# Patient Record
Sex: FEMALE | Race: WHITE | NOT HISPANIC OR LATINO | Employment: FULL TIME | URBAN - METROPOLITAN AREA
[De-identification: names, ages, dates, MRNs, and addresses within clinical notes are randomized per-mention and may not be internally consistent; named-entity substitution may affect disease eponyms.]

---

## 2021-06-07 ENCOUNTER — OFFICE VISIT (OUTPATIENT)
Dept: URGENT CARE | Facility: URGENT CARE | Age: 36
End: 2021-06-07
Payer: COMMERCIAL

## 2021-06-07 VITALS
WEIGHT: 250 LBS | TEMPERATURE: 97.6 F | SYSTOLIC BLOOD PRESSURE: 130 MMHG | OXYGEN SATURATION: 99 % | DIASTOLIC BLOOD PRESSURE: 79 MMHG | HEART RATE: 65 BPM

## 2021-06-07 DIAGNOSIS — R09.81 NASAL CONGESTION: ICD-10-CM

## 2021-06-07 DIAGNOSIS — H57.11 ACUTE RIGHT EYE PAIN: Primary | ICD-10-CM

## 2021-06-07 PROBLEM — F41.9 ANXIETY: Status: ACTIVE | Noted: 2021-06-07

## 2021-06-07 PROCEDURE — U0003 INFECTIOUS AGENT DETECTION BY NUCLEIC ACID (DNA OR RNA); SEVERE ACUTE RESPIRATORY SYNDROME CORONAVIRUS 2 (SARS-COV-2) (CORONAVIRUS DISEASE [COVID-19]), AMPLIFIED PROBE TECHNIQUE, MAKING USE OF HIGH THROUGHPUT TECHNOLOGIES AS DESCRIBED BY CMS-2020-01-R: HCPCS | Performed by: FAMILY MEDICINE

## 2021-06-07 PROCEDURE — U0005 INFEC AGEN DETEC AMPLI PROBE: HCPCS | Performed by: FAMILY MEDICINE

## 2021-06-07 PROCEDURE — 99203 OFFICE O/P NEW LOW 30 MIN: CPT | Performed by: FAMILY MEDICINE

## 2021-06-07 RX ORDER — SERTRALINE HYDROCHLORIDE 100 MG/1
150 TABLET, FILM COATED ORAL
COMMUNITY
Start: 2021-04-14

## 2021-06-07 RX ORDER — FLUTICASONE PROPIONATE 50 MCG
1-2 SPRAY, SUSPENSION (ML) NASAL DAILY
COMMUNITY

## 2021-06-07 RX ORDER — BUSPIRONE HYDROCHLORIDE 5 MG/1
5 TABLET ORAL
COMMUNITY
Start: 2021-04-14

## 2021-06-07 ASSESSMENT — ENCOUNTER SYMPTOMS
SHORTNESS OF BREATH: 0
NAUSEA: 0
VOMITING: 0
EYE DISCHARGE: 0
WHEEZING: 0
SORE THROAT: 0
PHOTOPHOBIA: 1
FEVER: 0
COUGH: 0
EYE ITCHING: 0

## 2021-06-07 NOTE — PROGRESS NOTES
Assessment & Plan     Nathaly was seen today for sinus problem.    Diagnoses and all orders for this visit:    Acute right eye pain, improving.  Differential was considered, including (but not limited to) acute sinusitis and preseptal cellulitis.  -     amoxicillin-clavulanate (AUGMENTIN) 875-125 MG tablet; Take 1 tablet by mouth 2 times daily for 7 days    Nasal congestion, with a known history of seasonal allergies.  Cannot rule out acute sinusitis, as noted above.  Patient is on Flonase.  COVID-19 testing is pending.  -     Symptomatic COVID-19 Virus (Coronavirus) by PCR    Discussed risks and benefits of treatment strategies.    Patient Instructions     Quarantine/infection precautions, as discussed.    Over-the-counter medications, only as discussed.    Warm packs 20 minutes three times daily, as discussed.     Augmentin, only as prescribed/discussed.    Continue Flonase, as before.    Follow-up if your condition worsens or does not improve over the next 72 hours.    Follow up in the ER immediately if you develop: severe/worsening eye pain, vision concerns, or other emergent symptoms.      Consider a follow up eye exam, as discussed.    Follow-up with primary care to establish care, as discussed.    Kasia Moss MD  [facility] NATANAEL Andersen is a 35 year old female, who presents to clinic today for the following health issues:    Chief Complaint   Patient presents with     Sinus Problem     X5 Days patient noticed swelling of right eye the day after receiving 2nd covid vaccine,experiencing alot of pressure in eye and pain, no red, itchy, watery symptoms, when applying pressure her symptoms feels better, taking ibuprofen - slight releif , also experiencing jaw and facial pain and right ear pain         HPI    The patient is a new patient, who received her 2nd COVID-19 vaccination (Moderna) 5 days ago, 6/2/2021.  The patient states that she awakened with right  eye/sinus pressure and puffiness/swelling the day after receiving her vaccine.  Patient reports chronic nasal congestion and postnasal drainage (on Claritin and Flonase), as well as allergies this time of the year.  Patient has a new puppy.  Right eye pain/swelling are 50% improved.  Right maxillary sinus pain is improving, but it is still 5-6/10 severity.  The patient states that she is prone to sinus infections, last treated with Augmentin and Prednisone 2/24/2021.  See the Review of Systems section below.  Treatments Tried:  Ibuprofen 600 mg q6 hours manages her pain.  Additional History:  Eyelash extensions were removed 1 week ago.  Last eye exam was ~ 1 year ago.  COVID-19/Other Exposures:  None reported.    Not pregnant, post hysterectomy.    Review of Systems   Constitutional: Negative for fever.   HENT: Positive for congestion and postnasal drip. Negative for sore throat.    Eyes: Positive for photophobia (With sunlight, but not indoors.). Negative for discharge, itching and visual disturbance.   Respiratory: Negative for cough, shortness of breath and wheezing.    Cardiovascular: Negative for chest pain.   Gastrointestinal: Negative for nausea and vomiting.         Objective    /79   Pulse 65   Temp 97.6  F (36.4  C) (Tympanic)   Wt 113.4 kg (250 lb)   SpO2 99%   Physical Exam   GENERAL APPEARANCE:  Awake, alert, and in no acute distress.  PSYCHIATRIC:  Pleasant, smiling affect.  HEENT:  Sclera anicteric.  No conjunctivitis.  There is mild edema noted involving the right upper eyelid, with tenderness to palpation noted involving the area directly beneath the right eye.  No erythema or ecchymosis.  No crepitus.  PERRLA.  Extraocular movements are intact.  No photophobia.  Bilateral TM's and canals are within normal limits.  Mild nasal congestion, with clear postnasal drainage.  Sinuses are not significantly tender to palpation.  No significant erythema in the posterior pharynx.  No edema or  exudates of the oral mucosa or posterior pharynx.  Mucous membranes moist.  NECK:  Spontaneous full range of motion.  No thyromegaly or mass.  No lymphadenopathy.  HEART:  Normal S1, S2.  Regular rate and rhythm.  No murmurs, rubs, or gallops.  LUNGS:  No respiratory distress.  No wheezes, rales, or rhonchi.  EXTREMITIES:  Moves 4 extremities.     NEUROLOGIC:  Gait within normal limits.  No facial droop or acute neurologic deficits.  SKIN:  No rash.

## 2021-06-07 NOTE — PATIENT INSTRUCTIONS
Quarantine/infection precautions, as discussed.    Over-the-counter medications, only as discussed.    Warm packs 20 minutes three times daily, as discussed.     Augmentin, only as prescribed/discussed.    Continue Flonase, as before.    Follow-up if your condition worsens or does not improve over the next 72 hours.    Follow up in the ER immediately if you develop: severe/worsening eye pain, vision concerns, or other emergent symptoms.

## 2021-06-08 LAB
LABORATORY COMMENT REPORT: NORMAL
SARS-COV-2 RNA RESP QL NAA+PROBE: NEGATIVE
SARS-COV-2 RNA RESP QL NAA+PROBE: NORMAL
SPECIMEN SOURCE: NORMAL
SPECIMEN SOURCE: NORMAL

## 2022-05-04 ENCOUNTER — OFFICE VISIT (OUTPATIENT)
Dept: URGENT CARE | Facility: URGENT CARE | Age: 37
End: 2022-05-04
Payer: COMMERCIAL

## 2022-05-04 VITALS
OXYGEN SATURATION: 98 % | SYSTOLIC BLOOD PRESSURE: 129 MMHG | DIASTOLIC BLOOD PRESSURE: 79 MMHG | TEMPERATURE: 98.6 F | HEART RATE: 76 BPM

## 2022-05-04 DIAGNOSIS — J22 LOWER RESPIRATORY TRACT INFECTION: ICD-10-CM

## 2022-05-04 DIAGNOSIS — J45.21 MILD INTERMITTENT ASTHMA WITH ACUTE EXACERBATION: ICD-10-CM

## 2022-05-04 DIAGNOSIS — J01.90 ACUTE SINUSITIS WITH COEXISTING CONDITION REQUIRING PROPHYLACTIC TREATMENT: Primary | ICD-10-CM

## 2022-05-04 PROCEDURE — 99214 OFFICE O/P EST MOD 30 MIN: CPT | Performed by: FAMILY MEDICINE

## 2022-05-04 RX ORDER — DOXYCYCLINE 100 MG/1
100 CAPSULE ORAL 2 TIMES DAILY
Qty: 20 CAPSULE | Refills: 0 | Status: SHIPPED | OUTPATIENT
Start: 2022-05-04 | End: 2022-05-14

## 2022-05-04 RX ORDER — METHYLPREDNISOLONE 4 MG
TABLET, DOSE PACK ORAL
Qty: 21 TABLET | Refills: 0 | Status: SHIPPED | OUTPATIENT
Start: 2022-05-04

## 2022-05-04 NOTE — PROGRESS NOTES
SUBJECTIVE:   Nathaly Ramirez is a 36 year old female presenting with a chief complaint of headache, congestion, sore throat, cough/chest congested.  More SOB, has inhaler.  Onset of symptoms was 1 week(s) ago.  Course of illness is worsening.    Severity moderate  Current and Associated symptoms: cough, chest congestion  Treatment measures tried include Fluids, Rest, OTC cough medication.  Predisposing factors include ill contact: Work, Hx asthma and allergies    Had migraine headaches and is followed by Neurology, was given Medrol dospak and did seem to improved    Has asthma, on sigulair and has inhaler    Completed Moderna COVID vaccinations.  Had COVID infection this January 2022    Home rapid COVID tests negative 4/27, [facility] 4/29 lab rapid and PCR and also negative    No past medical history on file.  Current Outpatient Medications   Medication Sig Dispense Refill     busPIRone (BUSPAR) 5 MG tablet Take 5 mg by mouth       fluticasone (FLONASE) 50 MCG/ACT nasal spray Spray 1-2 sprays into both nostrils daily       sertraline (ZOLOFT) 100 MG tablet Take 150 mg by mouth       Social History     Tobacco Use     Smoking status: Former Smoker     Smokeless tobacco: Never Used   Substance Use Topics     Alcohol use: Not on file       ROS:  Review of systems negative except as stated above.    OBJECTIVE:  /79 (BP Location: Right arm, Patient Position: Sitting, Cuff Size: Adult Regular)   Pulse 76   Temp 98.6  F (37  C)   SpO2 98%   GENERAL APPEARANCE: healthy, alert and no distress  EYES: EOMI,  PERRL, conjunctiva clear  RESP: lungs clear to auscultation - no rales, rhonchi or wheezes  PSYCH: mentation appears normal and affect normal/bright    ASSESSMENT/PLAN:   (J01.90) Acute sinusitis with coexisting condition requiring prophylactic treatment  (primary encounter diagnosis)  Plan: doxycycline hyclate (VIBRAMYCIN) 100 MG capsule            (J45.21) Mild intermittent asthma with acute exacerbation  Plan:  methylPREDNISolone (MEDROL DOSEPAK) 4 MG tablet        therapy pack        Continue with inhaler      (J22) Lower respiratory tract infection  Plan: doxycycline hyclate (VIBRAMYCIN) 100 MG capsule            Reassurance given, reviewed symptomatic treatment with sparingly with tylenol and ibuprofen as neurologist did not want her to take this due to rebound headaches.  Encourage plenty of fluids and rest.  RX Doxycycline given for sinus infection and lower respiratory tract infection due to worsening symptoms and underlying co-morbid medical diagnosis of asthma and allergies.  RX Medrol dospak given for asthma flare, encourage to use albuterol inhaler for cough, wheezing and SOB symptoms.    Follow up with primary provider if no improvement of symptoms in 1 week    Daniel Vines MD  May 4, 2022 6:57 PM

## 2023-02-11 ENCOUNTER — HEALTH MAINTENANCE LETTER (OUTPATIENT)
Age: 38
End: 2023-02-11

## 2024-03-09 ENCOUNTER — HEALTH MAINTENANCE LETTER (OUTPATIENT)
Age: 39
End: 2024-03-09

## 2024-12-11 ENCOUNTER — APPOINTMENT (OUTPATIENT)
Dept: URBAN - METROPOLITAN AREA CLINIC 253 | Age: 39
Setting detail: DERMATOLOGY
End: 2024-12-16

## 2024-12-11 VITALS — RESPIRATION RATE: 14 BRPM | WEIGHT: 200 LBS | HEIGHT: 72 IN

## 2024-12-11 DIAGNOSIS — L72.0 EPIDERMAL CYST: ICD-10-CM

## 2024-12-11 DIAGNOSIS — L909 UNSPECIFIED HYPERTROPHIC AND ATROPHIC CONDITIONS OF SKIN: ICD-10-CM

## 2024-12-11 DIAGNOSIS — L919 UNSPECIFIED HYPERTROPHIC AND ATROPHIC CONDITIONS OF SKIN: ICD-10-CM

## 2024-12-11 DIAGNOSIS — L663 OTHER SPECIFIED DISEASES OF HAIR AND HAIR FOLLICLES: ICD-10-CM

## 2024-12-11 DIAGNOSIS — L987 UNSPECIFIED HYPERTROPHIC AND ATROPHIC CONDITIONS OF SKIN: ICD-10-CM

## 2024-12-11 DIAGNOSIS — L738 OTHER SPECIFIED DISEASES OF HAIR AND HAIR FOLLICLES: ICD-10-CM

## 2024-12-11 PROBLEM — D23.72 OTHER BENIGN NEOPLASM OF SKIN OF LEFT LOWER LIMB, INCLUDING HIP: Status: ACTIVE | Noted: 2024-12-11

## 2024-12-11 PROBLEM — L02.221 FURUNCLE OF ABDOMINAL WALL: Status: ACTIVE | Noted: 2024-12-11

## 2024-12-11 PROCEDURE — OTHER COUNSELING: OTHER

## 2024-12-11 PROCEDURE — 99204 OFFICE O/P NEW MOD 45 MIN: CPT

## 2024-12-11 PROCEDURE — OTHER ADDITIONAL NOTES: OTHER

## 2024-12-11 PROCEDURE — OTHER MIPS QUALITY: OTHER

## 2024-12-11 PROCEDURE — OTHER PRESCRIPTION: OTHER

## 2024-12-11 RX ORDER — CHLORHEXIDINE GLUCONATE 213 G/1000ML
4% SOLUTION TOPICAL
Qty: 118 | Refills: 2 | Status: ERX | COMMUNITY
Start: 2024-12-11

## 2024-12-11 RX ORDER — CLINDAMYCIN PHOSPHATE 10 MG/ML
1% SOLUTION TOPICAL BID
Qty: 60 | Refills: 6 | Status: ERX | COMMUNITY
Start: 2024-12-11

## 2024-12-11 ASSESSMENT — LOCATION DETAILED DESCRIPTION DERM
LOCATION DETAILED: LEFT PROXIMAL POSTERIOR THIGH
LOCATION DETAILED: RIGHT GLUTEAL CREASE
LOCATION DETAILED: MONS PUBIS

## 2024-12-11 ASSESSMENT — LOCATION SIMPLE DESCRIPTION DERM
LOCATION SIMPLE: RIGHT GLUTEAL CREASE
LOCATION SIMPLE: GROIN
LOCATION SIMPLE: LEFT POSTERIOR THIGH

## 2024-12-11 ASSESSMENT — LOCATION ZONE DERM
LOCATION ZONE: LEG
LOCATION ZONE: VULVA

## 2024-12-11 NOTE — PROCEDURE: ADDITIONAL NOTES
Render Risk Assessment In Note?: no
Detail Level: Simple
Additional Notes: Discussed most effective option for removal is excision. Patient may schedule a 30 minute appointment if elected.
Additional Notes: Discussed option for excision. Patient may have both her cyst and her Nevus lipomatosis excised at the same appointment.

## 2024-12-11 NOTE — HPI: SKIN LESION
Is This A New Presentation, Or A Follow-Up?: Growth
Additional History: She has a suspected cyst on the left gluteal crease. It has been present for 12-13 years and has gotten bigger within the past year. This rubs on her underwear.\\nOn the right buttock there has been a fluctuating spot similar to the site on the left.

## 2024-12-18 ENCOUNTER — APPOINTMENT (OUTPATIENT)
Dept: URBAN - METROPOLITAN AREA CLINIC 253 | Age: 39
Setting detail: DERMATOLOGY
End: 2024-12-18

## 2024-12-18 VITALS — RESPIRATION RATE: 14 BRPM | WEIGHT: 200 LBS | HEIGHT: 71 IN

## 2024-12-18 DIAGNOSIS — L72.0 EPIDERMAL CYST: ICD-10-CM

## 2024-12-18 DIAGNOSIS — L919 UNSPECIFIED HYPERTROPHIC AND ATROPHIC CONDITIONS OF SKIN: ICD-10-CM

## 2024-12-18 DIAGNOSIS — L987 UNSPECIFIED HYPERTROPHIC AND ATROPHIC CONDITIONS OF SKIN: ICD-10-CM

## 2024-12-18 DIAGNOSIS — L909 UNSPECIFIED HYPERTROPHIC AND ATROPHIC CONDITIONS OF SKIN: ICD-10-CM

## 2024-12-18 PROBLEM — D23.72 OTHER BENIGN NEOPLASM OF SKIN OF LEFT LOWER LIMB, INCLUDING HIP: Status: ACTIVE | Noted: 2024-12-18

## 2024-12-18 PROCEDURE — OTHER PUNCH EXCISION: OTHER

## 2024-12-18 PROCEDURE — OTHER EXCISION: OTHER

## 2024-12-18 PROCEDURE — 11400 EXC TR-EXT B9+MARG 0.5 CM<: CPT

## 2024-12-18 PROCEDURE — 13121 CMPLX RPR S/A/L 2.6-7.5 CM: CPT | Mod: 59

## 2024-12-18 PROCEDURE — OTHER COUNSELING: OTHER

## 2024-12-18 PROCEDURE — OTHER PHOTO-DOCUMENTATION: OTHER

## 2024-12-18 PROCEDURE — 11402 EXC TR-EXT B9+MARG 1.1-2 CM: CPT

## 2024-12-18 ASSESSMENT — LOCATION DETAILED DESCRIPTION DERM
LOCATION DETAILED: RIGHT BUTTOCK
LOCATION DETAILED: LEFT PROXIMAL POSTERIOR THIGH

## 2024-12-18 ASSESSMENT — LOCATION SIMPLE DESCRIPTION DERM
LOCATION SIMPLE: LEFT POSTERIOR THIGH
LOCATION SIMPLE: RIGHT BUTTOCK

## 2024-12-18 ASSESSMENT — LOCATION ZONE DERM
LOCATION ZONE: LEG
LOCATION ZONE: TRUNK

## 2024-12-18 NOTE — HPI: SKIN LESION
How Severe Is Your Skin Lesion?: moderate
Has Your Skin Lesion Been Treated?: not been treated
Is This A New Presentation, Or A Follow-Up?: Growth
Additional History: She presents for excision of nevus lipomatosis.

## 2024-12-18 NOTE — PROCEDURE: PUNCH EXCISION
Size Of Lesion In Cm: 0
Anesthesia Volume In Cc: 1
Excision Method: 5 mm Punch
Repair Type: Complex
Intermediate / Complex Repair - Final Wound Length In Cm: 0.9
Complex Requirements: Extensive Undermining Performed?: Yes
Width Of Defect Perpendicular To Closure In Cm (Required): 0.5
Undermining Type: Entire Wound
Complex Requirements: Exposure Of Vital Structure?: No
Debridement Text: The wound edges were debrided prior to proceeding with the closure to facilitate wound healing.
Helical Rim Text: The closure involved the helical rim.
Vermilion Border Text: The closure involved the vermilion border.
Nostril Rim Text: The closure involved the nostril rim.
Retention Suture Text: Retention sutures were placed to support the closure and prevent dehiscence.
Suture Removal: 14 days
Lab: [id number]
Detail Level: Detailed
Excision Depth: adipose tissue
Medical Necessity Clause: The excision was medically necessary because the lesion which was excised was
Anesthesia Type: 2% lidocaine with epinephrine and a 1:10 solution of 8.4% sodium bicarbonate
Hemostasis: Manual Pressure
Estimated Blood Loss (Cc): minimal
Deep Sutures: 4-0 PGCL
Epidermal Sutures: 4-0 Polypropylene
Number Of Epidermal Sutures (Optional): 2
Epidermal Closure: simple interrupted
Wound Care: Petrolatum
Dressing: dry sterile dressing
Complex Repair Preamble Text (Leave Blank If You Do Not Want): Extensive wide undermining was performed.
Intermediate Repair Preamble Text (Leave Blank If You Do Not Want): Undermining was performed with blunt dissection.
1.5 Mm Punch Excision Text: A 1.5 mm punch was used to make an initial incision over the lesion.  After this overlying column of skin was removed, blunt dissection was used to free the lesion from the surrounding tissues and the lesion was extirpated through the surgical opening made by the punch biopsy.
2 Mm Punch Excision Text: A 2 mm punch was used to make an initial incision over the lesion.  After this overlying column of skin was removed, blunt dissection was used to free the lesion from the surrounding tissues and the lesion was extirpated through the surgical opening made by the punch biopsy.
2.5 Mm Punch Excision Text: A 2.5 mm punch was used to make an initial incision over the lesion.  After this overlying column of skin was removed, blunt dissection was used to free the lesion from the surrounding tissues and the lesion was extirpated through the surgical opening made by the punch biopsy.
3 Mm Punch Excision Text: A 3 mm punch was used to make an initial incision over the lesion.  After this overlying column of skin was removed, blunt dissection was used to free the lesion from the surrounding tissues and the lesion was extirpated through the surgical opening made by the punch biopsy.
3.5 Mm Punch Excision Text: A 3.5 mm punch was used to make an initial incision over the lesion.  After this overlying column of skin was removed, blunt dissection was used to free the lesion from the surrounding tissues and the lesion was extirpated through the surgical opening made by the punch biopsy.
4 Mm Punch Excision Text: A 4 mm punch was used to make an initial incision over the lesion.  After this overlying column of skin was removed, blunt dissection was used to free the lesion from the surrounding tissues and the lesion was extirpated through the surgical opening made by the punch biopsy.
4.5 Mm Punch Excision Text: A 4.5 mm punch was used to make an initial incision over the lesion.  After this overlying column of skin was removed, blunt dissection was used to free the lesion from the surrounding tissues and the lesion was extirpated through the surgical opening made by the punch biopsy.
5 Mm Punch Excision Text: A 5 mm punch was used to make an initial incision over the lesion.  After this overlying column of skin was removed, blunt dissection was used to free the lesion from the surrounding tissues and the lesion was extirpated through the surgical opening made by the punch biopsy.
6 Mm Punch Excision Text: A 6 mm punch was used to make an initial incision over the lesion.  After this overlying column of skin was removed, blunt dissection was used to free the lesion from the surrounding tissues and the lesion was extirpated through the surgical opening made by the punch biopsy.
7 Mm Punch Excision Text: A 7 mm punch was used to make an initial incision over the lesion.  After this overlying column of skin was removed, blunt dissection was used to free the lesion from the surrounding tissues and the lesion was extirpated through the surgical opening made by the punch biopsy.
8 Mm Punch Excision Text: A 8 mm punch was used to make an initial incision over the lesion.  After this overlying column of skin was removed, blunt dissection was used to free the lesion from the surrounding tissues and the lesion was extirpated through the surgical opening made by the punch biopsy.
10 Mm Punch Excision Text: A 10 mm punch was used to make an initial incision over the lesion.  After this overlying column of skin was removed, blunt dissection was used to free the lesion from the surrounding tissues and the lesion was extirpated through the surgical opening made by the punch biopsy.
12 Mm Punch Excision Text: A 12 mm punch was used to make an initial incision over the lesion.  After this overlying column of skin was removed, blunt dissection was used to free the lesion from the surrounding tissues and the lesion was extirpated through the surgical opening made by the punch biopsy.
Purse String (Intermediate) Text: Given the location of the defect and the characteristics of the surrounding skin a purse string intermediate closure was deemed most appropriate.  Undermining was performed circumferentially around the surgical defect.  A purse string suture was then placed and tightened.
Path Notes (To The Dermatopathologist): Please check margins.
Medical Necessity Clause: This procedure was medically necessary because the lesion that was treated was:
Consent was obtained from the patient. The risks and benefits to therapy were discussed in detail. Specifically, the risks of infection, scarring, bleeding, prolonged wound healing, incomplete removal, allergy to anesthesia, nerve injury and recurrence were addressed. Prior to the procedure, the treatment site was clearly identified and confirmed by the patient. All components of Universal Protocol/PAUSE Rule completed.
Post-Care Instructions: I reviewed with the patient in detail post-care instructions. Patient is not to engage in any heavy lifting, exercise, or swimming for the next 14 days. Should the patient develop any fevers, chills, bleeding, severe pain patient will contact the office immediately.
Billing Type: Third-Party Bill

## 2024-12-18 NOTE — PROCEDURE: EXCISION

## 2024-12-27 ENCOUNTER — APPOINTMENT (OUTPATIENT)
Dept: URBAN - METROPOLITAN AREA CLINIC 253 | Age: 39
Setting detail: DERMATOLOGY
End: 2024-12-27

## 2024-12-27 DIAGNOSIS — Z48.817 ENCOUNTER FOR SURGICAL AFTERCARE FOLLOWING SURGERY ON THE SKIN AND SUBCUTANEOUS TISSUE: ICD-10-CM

## 2024-12-27 DIAGNOSIS — Z48.02 ENCOUNTER FOR REMOVAL OF SUTURES: ICD-10-CM

## 2024-12-27 PROCEDURE — OTHER SUTURE REMOVAL (GLOBAL PERIOD): OTHER

## 2024-12-27 PROCEDURE — OTHER PHOTO-DOCUMENTATION: OTHER

## 2024-12-27 PROCEDURE — OTHER ADDITIONAL NOTES: OTHER

## 2024-12-27 PROCEDURE — OTHER COUNSELING: OTHER

## 2024-12-27 ASSESSMENT — LOCATION DETAILED DESCRIPTION DERM
LOCATION DETAILED: LEFT GLUTEAL CREASE
LOCATION DETAILED: LEFT PROXIMAL POSTERIOR THIGH
LOCATION DETAILED: RIGHT BUTTOCK

## 2024-12-27 ASSESSMENT — LOCATION SIMPLE DESCRIPTION DERM
LOCATION SIMPLE: LEFT GUTEAL CREASE
LOCATION SIMPLE: RIGHT BUTTOCK
LOCATION SIMPLE: LEFT POSTERIOR THIGH

## 2024-12-27 ASSESSMENT — LOCATION ZONE DERM
LOCATION ZONE: LEG
LOCATION ZONE: TRUNK

## 2024-12-27 NOTE — PROCEDURE: SUTURE REMOVAL (GLOBAL PERIOD)
Detail Level: Detailed
Add 71537 Cpt? (Important Note: In 2017 The Use Of 14054 Is Being Tracked By Cms To Determine Future Global Period Reimbursement For Global Periods): no
Suture Removal Completed By (Optional): NIKO Saucedo

## 2024-12-27 NOTE — PROCEDURE: ADDITIONAL NOTES
Detail Level: Simple
Render Risk Assessment In Note?: no
Additional Notes: Recommend stitch removal as incision site is well healed and intact. Skin appears irritated and draining blood around stitch site. Recommend warm compresses due to discomfort. No signs of infection. Instructed patient to contact office if conditions continue to worsen over the weekend

## 2024-12-30 ENCOUNTER — RX ONLY (RX ONLY)
Age: 39
End: 2024-12-30

## 2024-12-30 RX ORDER — DOXYCYCLINE 100 MG/1
CAPSULE ORAL
Qty: 28 | Refills: 0 | Status: ERX | COMMUNITY
Start: 2024-12-30

## 2024-12-30 RX ORDER — GENTAMICIN SULFATE 1 MG/G
OINTMENT TOPICAL
Qty: 30 | Refills: 0 | Status: ERX | COMMUNITY
Start: 2024-12-30

## 2025-01-08 ENCOUNTER — HOSPITAL ENCOUNTER (EMERGENCY)
Facility: CLINIC | Age: 40
Discharge: HOME | End: 2025-01-08
Attending: PHYSICIAN ASSISTANT | Admitting: PHYSICIAN ASSISTANT
Payer: COMMERCIAL

## 2025-01-08 VITALS
HEART RATE: 86 BPM | HEIGHT: 72 IN | RESPIRATION RATE: 19 BRPM | WEIGHT: 189.82 LBS | SYSTOLIC BLOOD PRESSURE: 129 MMHG | DIASTOLIC BLOOD PRESSURE: 103 MMHG | OXYGEN SATURATION: 100 % | BODY MASS INDEX: 25.71 KG/M2 | TEMPERATURE: 97.6 F

## 2025-01-08 DIAGNOSIS — K62.3 PARTIAL RECTAL PROLAPSE: ICD-10-CM

## 2025-01-08 PROCEDURE — 99282 EMERGENCY DEPT VISIT SF MDM: CPT

## 2025-01-08 ASSESSMENT — COLUMBIA-SUICIDE SEVERITY RATING SCALE - C-SSRS
2. HAVE YOU ACTUALLY HAD ANY THOUGHTS OF KILLING YOURSELF IN THE PAST MONTH?: NO
6. HAVE YOU EVER DONE ANYTHING, STARTED TO DO ANYTHING, OR PREPARED TO DO ANYTHING TO END YOUR LIFE?: NO
1. IN THE PAST MONTH, HAVE YOU WISHED YOU WERE DEAD OR WISHED YOU COULD GO TO SLEEP AND NOT WAKE UP?: NO

## 2025-01-08 NOTE — ED TRIAGE NOTES
Here for rectal pain. Pt not sure if it is hemorrhoids or if pain is related to a cyst that she removed recently. Pt is still taking antibiotics following the cyst removal which became infected. Alert and ambulatory.

## 2025-01-08 NOTE — DISCHARGE INSTRUCTIONS
Your symptoms appear to be a product of a very small degree of rectal prolapse. Consume high-fiber foods, fiber supplements (total 25 to 30 grams per day), and 1 to 2 liters per day of water and other fluids to help regulate bowel movements. Consider careful use of Miralax as well to help avoid constipation, however be sure to minimize use to avoid diarrhea, which can worsen prolapse as well. Use Tylenol and ibuprofen for pain. Follow up with colon and rectal for further evaluation and management.

## 2025-01-08 NOTE — ED PROVIDER NOTES
Emergency Department Note      History of Present Illness     Chief Complaint   Rectal/perineal Pain    HPI   Nathaly Ramirez is a 39 year old female with a history of hemorrhoids and unspecified prolapse who presents with worsening rectal pain for one month. She had been dealing with hemorrhoids for sixteen years since undergoing a vaginal delivery. Her pain worsened today which prompted her visit. She adds the pain feels like sharp razor blades and is waking her up at night. She has noticed some bright red rectal blood and rectal pain when she wipes. No blood in stool or in the toilet. No black stools. She has associated nausea with the pain. She has had constipation and has been trying Miralax which has helped at times but she notes her stool is hard more recently. She notes she deals with constipation on a more regular basis. No diarrhea. Pain worsens with bowel movement, sitting, or driving in the car. She tried essential oils for pain which typically work for her but have since stopped helping her pain. No other pain medications used topically. She was using hemorrhoid wipes a few weeks ago. She has also taken ibuprofen. No vomiting, fever, or chills. No known sick contacts. She notes history of prolapse which she went to physical therapy for in the past. She is unsure what type of prolapse she had. She has a history of two vaginal deliveries. She had a cyst removed two weeks ago at [facility] on the back of her left upper thigh under her buttocks. She denies having pain in the area but reports she is currently on antibiotics due to an infection. No history of rectal abscess. She had a colonoscopy ten years ago and adds they recommended hemorrhoid removal at that time which she did not pursue.     Independent Historian   None    Review of External Notes   None    Past Medical History     Medical History and Problem List   Anxiety with depression   Attention deficit hyperactivity disorder (ADHD)   Left  ovarian cyst   Mild intermittent asthma without complication   Migraine without aura, intractable, without status migrainosus   PCOS    Medications   Buspirone   Fluticasone nasal spray  Sertraline    Surgical History   Hysterectomy  Oophorectomy     Physical Exam     Patient Vitals for the past 24 hrs:   BP Temp Temp src Pulse Resp SpO2 Height Weight   01/08/25 0737 (!) 129/103 97.6  F (36.4  C) Temporal 86 19 100 % 1.829 m (6') 86.1 kg (189 lb 13.1 oz)     Physical Exam  Constitutional: Pleasant. Cooperative.   Eyes: Pupils equally round and reactive  HENT: Head is normal in appearance. Oropharynx is normal with moist mucus membranes.  Respiratory: Normal respiratory effort.  Musculoskeletal: No asymmetry of the lower extremities.  Skin: Healing surgical wounds noted to proximal aspect of bilateral posterior thighs, no surrounding erythema, warmth, tenderness.   Neurologic: Cranial nerves grossly intact, normal cognition, no focal deficits. Alert and oriented x 3.   Psychiatric: Normal affect.  Rectal (Chaperoned)   Normal tone   No gross blood   Small concentric ring of tissue noted to rectum.   No obvious external hemorrhoids noted   No anal fissures noted  Nursing notes and vital signs reviewed.      Diagnostics     Independent Interpretation   None    ED Course      Discussion of Management   None    ED Course   ED Course as of 01/08/25 0952   Wed Jan 08, 2025   0925 I obtained the patient's history and examined as noted above.    0932 I rechecked the patient and performed a rectal exam with a female chaperone present.      Additional Documentation  None    Medical Decision Making / Diagnosis     CMS Diagnoses: None    MIPS       None    MDM   Nathaly Ramirez is a 39 year old female with a history of vaginal and rectal prolapse s/p pregnancy who presents to the ED for evaluation of rectal pain. See HPI as above for additional details. Vitals and physical exam as above. DDx was broad and included hemorrhoids,  fissures, perirectal abscess, constipation, prolapse, amongst others. Exam suggestive for trivial amount of rectal prolapse. Remainder of differential was felt to be less likely based upon history and exam. I was able to gently reduce the prolapse twice, however both times immediately following reduction the prolapse, it spontaneously extruded again. Discussed conservative cares including use of Tylenol and ibuprofen, use of fiber, cautious use of Miralax to avoid constipation while simultaneously avoiding diarrhea. Discussed importance of follow up with colon and rectal with possible referral to PT. Do feel patient is safe for discharge to home. Discussed reasons to return. All questions answered. Patient discharged to home in stable condition.    Disposition   The patient was discharged.     Diagnosis     ICD-10-CM    1. Partial rectal prolapse  K62.3          Discharge Medications   New Prescriptions    No medications on file     Scribe Disclosure:  I, Laurita Hauser, am serving as a scribe at 9:09 AM on 1/8/2025 to document services personally performed by Arsalan Stevens PA-C based on my observations and the provider's statements to me.      This record was created at least in part using electronic voice recognition software, so please excuse any typographical errors.       Arsalan Stevens PA-C  01/08/25 1000

## 2025-01-09 NOTE — TELEPHONE ENCOUNTER
Diagnosis, Referred by & from: Partial Rectal Prolapse   Appt date: 3/20/2025   NOTES STATUS DETAILS   OFFICE NOTE from referring provider N/A    OFFICE NOTE from other specialist Care Everywhere [facility]:  3/29/22 - URO OV with FRANKLYN Silva   DISCHARGE SUMMARY from hospital N/A    DISCHARGE REPORT from the ER Care Everywhere / Internal [facility]:  1/8/25 - ED OV with FRANKLYN Lino    [facility]:  9/6/23 - ED OV with Dr. Vaughn   OPERATIVE REPORT N/A    MEDICATION LIST Internal    LABS     ANAL PAP/CEA N/A    BIOPSIES/PATHOLOGY RELATED TO DIAGNOSIS N/A    DIAGNOSTIC PROCEDURES     PFC TESTING (from the Pelvic floor center includes Manometry, PDNL, EMG, etc.) N/A    COLONOSCOPY (most recent all time after 5 years) N/A    FLEX SIGMOIDOSCOPY N/A    UPPER ENDOSCOPY (EGD) N/A    ERCP N/A    IMAGING (DISC & REPORT)      CT In process [facility]:  9/6/23 - CT Abd/pelvis    [facility]:  5/22/22 - CT Abd/Pelvis   MRI N/A    XRAY N/A    ULTRASOUND  (ENDOANAL/ENDORECTAL) Received [facility]:  9/13/23 - US Pelvic     Records Requested  01/09/25    Facility  [facility]  Fax: [phone]  [facility]  Fax: [phone]  [facility]  Fax: [phone]   Outcome * 1/9/25 10:58 AM Faxed request to [facility], [facility], and [facility] for images to be pushed to [place] [facility]. - Sofie    * 1/9/25 12:03 PM Images received from [facility] and [facility] and attached to the patient in PACs. Pamella Carrizales

## 2025-01-15 ENCOUNTER — OFFICE VISIT (OUTPATIENT)
Dept: SURGERY | Facility: CLINIC | Age: 40
End: 2025-01-15
Payer: COMMERCIAL

## 2025-01-15 VITALS
DIASTOLIC BLOOD PRESSURE: 83 MMHG | HEART RATE: 83 BPM | HEIGHT: 72 IN | BODY MASS INDEX: 25.73 KG/M2 | SYSTOLIC BLOOD PRESSURE: 143 MMHG | OXYGEN SATURATION: 99 % | WEIGHT: 190 LBS

## 2025-01-15 DIAGNOSIS — K64.9 HEMORRHOIDS, UNSPECIFIED HEMORRHOID TYPE: ICD-10-CM

## 2025-01-15 DIAGNOSIS — K62.3 PARTIAL RECTAL PROLAPSE: ICD-10-CM

## 2025-01-15 DIAGNOSIS — K62.89 HYPERTROPHIED ANAL PAPILLA: ICD-10-CM

## 2025-01-15 DIAGNOSIS — K62.3 PARTIAL RECTAL PROLAPSE: Primary | ICD-10-CM

## 2025-01-15 RX ORDER — ACETAMINOPHEN 325 MG/1
TABLET ORAL
COMMUNITY

## 2025-01-15 RX ORDER — POLYETHYLENE GLYCOL 3350 17 G/17G
POWDER, FOR SOLUTION ORAL
COMMUNITY

## 2025-01-15 RX ORDER — DOXYCYCLINE 100 MG/1
CAPSULE ORAL
COMMUNITY
Start: 2024-12-30

## 2025-01-15 ASSESSMENT — PATIENT HEALTH QUESTIONNAIRE - PHQ9
SUM OF ALL RESPONSES TO PHQ QUESTIONS 1-9: 13
SUM OF ALL RESPONSES TO PHQ QUESTIONS 1-9: 13
10. IF YOU CHECKED OFF ANY PROBLEMS, HOW DIFFICULT HAVE THESE PROBLEMS MADE IT FOR YOU TO DO YOUR WORK, TAKE CARE OF THINGS AT HOME, OR GET ALONG WITH OTHER PEOPLE: SOMEWHAT DIFFICULT

## 2025-01-15 ASSESSMENT — PAIN SCALES - GENERAL: PAINLEVEL_OUTOF10: EXTREME PAIN (8)

## 2025-01-15 NOTE — LETTER
1/15/2025       RE: Nathaly Ramirez  [address]  [address]  [facility] [address]     Dear Colleague,    Thank you for referring your patient, Nathaly Ramirez, to the [facility] COLON AND RECTAL SURGERY CLINIC [facility]. Please see a copy of my visit note below.    Colon and Rectal Surgery Consult Clinic Note    Date: 1/15/2025     Referring provider:  Arsalan Stevens PA-C  [facility]  [address] [place] [address]     RE: Nathaly Ramirez  : 1985  MARIANGEL: 1/15/2025    Nathaly Ramirez is a very pleasant 39 year old female here for hemorrhoid versus rectal prolapse.    HPI:  Has hemorrhoidal pain. When it flares it makes her want to pass out. It prolapses out and does not stay in. This has been as large as 3 inches. Has pressure all the time. Had a mole on her left buttock removed and this started all her symptoms. She was on antibiotics after this had gotten infected. Has some constipation and started Miralax and then got diarrhea. Has abdominal pressure for the past few days. Has done pelvic floor physical therapy for a rectocele.  Has had a hysterectomy for a large cyst and uterine bleeding. Has had two vaginal deliveries. Had tearing with these. No difficulty with bowel movements. Unsure if he is leaking mucous or if this the hemorrhoid medicine.  No anorectal surgeries in the past.   Had colonoscopy about 13 years ago for chronic constipation and she had one polyp.  No family history of colon cancer.    Physical Examination:  BP (!) 143/83 (BP Location: Right arm, Patient Position: Sitting, Cuff Size: Adult Regular)   Pulse 83   Ht 6'   Wt 190 lb   SpO2 99%   BMI 25.77 kg/m    General: Alert, oriented, in no acute distress, sitting comfortably  HEENT: Mucous membranes moist  Patient was examined after straining on the toilet.  She had a large prolapsing hemorrhoid/hypertrophied anal papilla but no evidence of full-thickness  rectal prolapse.    Perianal external examination: Exam was chaperoned by EBONY Shannon   Perianal skin: Intact with no excoriation or lichenification.  Lesions: No evidence of an external lesion, nodularity, or induration in the perianal region.  Eversion of buttocks: There was not evidence of an anal fissure. Details: N/A.  Skin tags or external hemorrhoids: Yes: skin tag in the posterior midline.  Large prolapsing hemorrhoid versus hypertrophied anal papilla with some clots present..    Digital rectal examination: Was performed.   Sphincter tone: Good.  Palpable lesions: No.  Other: None.  Bimanual examination: was not performed    Anoscopy: Was performed.   Hemorrhoids: Yes.  Lesions: No    Assessment/Plan: 39 year old female with prolapsing hemorrhoid versus hypertrophied anal papilla.  This had some clots in it as well and is quite symptomatic for her.  It is hard for her to reduce and is very painful.  I recommended excision and we will try to get her in in the next 1 to 2 weeks given her significant symptoms.  In the meantime, continue on a laxative to keep stools soft and avoid any straining.  She is concerned that she may also have rectal prolapse as she thinks she has noticed some concentric rings in the past.  We certainly can work this up at a future date if she continues to be symptomatic. Patient's questions were answered to her stated satisfaction and she is in agreement with this plan.     Medical history:  No past medical history on file.    Surgical history:  Past Surgical History:   Procedure Laterality Date     HYSTERECTOMY       OOPHORECTOMY         Problem list:    Patient Active Problem List    Diagnosis Date Noted     Anxiety 06/07/2021     Priority: Medium       Medications:  Current Outpatient Medications   Medication Sig Dispense Refill     acetaminophen (TYLENOL) 325 MG tablet        busPIRone (BUSPAR) 5 MG tablet Take 5 mg by mouth       doxycycline monohydrate (MONODOX) 100 MG  capsule        fluticasone (FLONASE) 50 MCG/ACT nasal spray Spray 1-2 sprays into both nostrils daily       ibuprofen (ADVIL/MOTRIN) 100 MG tablet        polyethylene glycol (MIRALAX) 17 g packet        sertraline (ZOLOFT) 100 MG tablet Take 150 mg by mouth       methylPREDNISolone (MEDROL DOSEPAK) 4 MG tablet therapy pack Follow Package Directions 21 tablet 0       Allergies:  No Known Allergies    Family history:  No family history on file.    Social history:  Social History     Tobacco Use     Smoking status: Every Day     Types: Cigarettes     Smokeless tobacco: Never   Substance Use Topics     Alcohol use: Not on file    Marital status: .    Nursing Notes:   Pinky Eller  1/15/2025  1:48 PM  Signed  Chief Complaint   Patient presents with     Consult     Rectal prolapse       Vitals:    01/15/25 1344   BP: (!) 143/83   BP Location: Right arm   Patient Position: Sitting   Cuff Size: Adult Regular   Pulse: 83   SpO2: 99%   Weight: 190 lb   Height: 6'       Body mass index is 25.77 kg/m .    Pinky Eller, EMT     30 minutes spent on the date of encounter performing chart review, history and exam, documentation and further activities as noted above with an additional 2 minutes for anoscopy.     GONZALO Box, NP-C  Colon and Rectal Surgery   [facility]    This note was created using speech recognition software and may contain unintended word substitutions.      Again, thank you for allowing me to participate in the care of your patient.      Sincerely,    GONZALO Box CNP

## 2025-01-15 NOTE — PROGRESS NOTES
Colon and Rectal Surgery Consult Clinic Note    Date: 1/15/2025     Referring provider:  Arsalan Stevens PA-C  [facility] [address] [place] [address]     RE: Nathaly Ramirez  : 1985  MARIANGEL: 1/15/2025    Nathaly Ramirez is a very pleasant 39 year old female here for hemorrhoid versus rectal prolapse.    HPI:  Has hemorrhoidal pain. When it flares it makes her want to pass out. It prolapses out and does not stay in. This has been as large as 3 inches. Has pressure all the time. Had a mole on her left buttock removed and this started all her symptoms. She was on antibiotics after this had gotten infected. Has some constipation and started Miralax and then got diarrhea. Has abdominal pressure for the past few days. Has done pelvic floor physical therapy for a rectocele.  Has had a hysterectomy for a large cyst and uterine bleeding. Has had two vaginal deliveries. Had tearing with these. No difficulty with bowel movements. Unsure if he is leaking mucous or if this the hemorrhoid medicine.  No anorectal surgeries in the past.   Had colonoscopy about 13 years ago for chronic constipation and she had one polyp.  No family history of colon cancer.    Physical Examination:  BP (!) 143/83 (BP Location: Right arm, Patient Position: Sitting, Cuff Size: Adult Regular)   Pulse 83   Ht 6'   Wt 190 lb   SpO2 99%   BMI 25.77 kg/m    General: Alert, oriented, in no acute distress, sitting comfortably  HEENT: Mucous membranes moist  Patient was examined after straining on the toilet.  She had a large prolapsing hemorrhoid/hypertrophied anal papilla but no evidence of full-thickness rectal prolapse.    Perianal external examination: Exam was chaperoned by EBONY Shannon   Perianal skin: Intact with no excoriation or lichenification.  Lesions: No evidence of an external lesion, nodularity, or induration in the perianal region.  Eversion of buttocks: There was not evidence of an anal fissure. Details:  N/A.  Skin tags or external hemorrhoids: Yes: skin tag in the posterior midline.  Large prolapsing hemorrhoid versus hypertrophied anal papilla with some clots present..    Digital rectal examination: Was performed.   Sphincter tone: Good.  Palpable lesions: No.  Other: None.  Bimanual examination: was not performed    Anoscopy: Was performed.   Hemorrhoids: Yes.  Lesions: No    Assessment/Plan: 39 year old female with prolapsing hemorrhoid versus hypertrophied anal papilla.  This had some clots in it as well and is quite symptomatic for her.  It is hard for her to reduce and is very painful.  I recommended excision and we will try to get her in in the next 1 to 2 weeks given her significant symptoms.  In the meantime, continue on a laxative to keep stools soft and avoid any straining.  She is concerned that she may also have rectal prolapse as she thinks she has noticed some concentric rings in the past.  We certainly can work this up at a future date if she continues to be symptomatic. Patient's questions were answered to her stated satisfaction and she is in agreement with this plan.     Medical history:  No past medical history on file.    Surgical history:  Past Surgical History:   Procedure Laterality Date    HYSTERECTOMY      OOPHORECTOMY         Problem list:    Patient Active Problem List    Diagnosis Date Noted    Anxiety 06/07/2021     Priority: Medium       Medications:  Current Outpatient Medications   Medication Sig Dispense Refill    acetaminophen (TYLENOL) 325 MG tablet       busPIRone (BUSPAR) 5 MG tablet Take 5 mg by mouth      doxycycline monohydrate (MONODOX) 100 MG capsule       fluticasone (FLONASE) 50 MCG/ACT nasal spray Spray 1-2 sprays into both nostrils daily      ibuprofen (ADVIL/MOTRIN) 100 MG tablet       polyethylene glycol (MIRALAX) 17 g packet       sertraline (ZOLOFT) 100 MG tablet Take 150 mg by mouth      methylPREDNISolone (MEDROL DOSEPAK) 4 MG tablet therapy pack Follow Package  Directions 21 tablet 0       Allergies:  No Known Allergies    Family history:  No family history on file.    Social history:  Social History     Tobacco Use    Smoking status: Every Day     Types: Cigarettes    Smokeless tobacco: Never   Substance Use Topics    Alcohol use: Not on file    Marital status: .    Nursing Notes:   Pinky Eller  1/15/2025  1:48 PM  Signed  Chief Complaint   Patient presents with    Consult     Rectal prolapse       Vitals:    01/15/25 1344   BP: (!) 143/83   BP Location: Right arm   Patient Position: Sitting   Cuff Size: Adult Regular   Pulse: 83   SpO2: 99%   Weight: 190 lb   Height: 6'       Body mass index is 25.77 kg/m .    EBONY Shannon     30 minutes spent on the date of encounter performing chart review, history and exam, documentation and further activities as noted above with an additional 2 minutes for anoscopy.     GONZALO Sheth, NP-C  Colon and Rectal Surgery   [facility]    This note was created using speech recognition software and may contain unintended word substitutions.

## 2025-01-15 NOTE — NURSING NOTE
Chief Complaint   Patient presents with    Consult     Rectal prolapse       Vitals:    01/15/25 1344   BP: (!) 143/83   BP Location: Right arm   Patient Position: Sitting   Cuff Size: Adult Regular   Pulse: 83   SpO2: 99%   Weight: 190 lb   Height: 6'       Body mass index is 25.77 kg/m .    Pinky Eller, EMT

## 2025-01-16 ENCOUNTER — TELEPHONE (OUTPATIENT)
Dept: SURGERY | Facility: CLINIC | Age: 40
End: 2025-01-16
Payer: COMMERCIAL

## 2025-01-16 PROBLEM — K62.3 PARTIAL RECTAL PROLAPSE: Status: ACTIVE | Noted: 2025-01-15

## 2025-01-16 PROBLEM — K62.89 HYPERTROPHIED ANAL PAPILLA: Status: ACTIVE | Noted: 2025-01-15

## 2025-01-16 PROBLEM — K64.9 HEMORRHOIDS, UNSPECIFIED HEMORRHOID TYPE: Status: ACTIVE | Noted: 2025-01-15

## 2025-01-16 NOTE — TELEPHONE ENCOUNTER
Called patient to schedule surgery with Dr. Subramanian    Spoke with: Nathaly     Date of Surgery: 01/20/2025    Estimated Arrival time Discussed with Patient:  No    [facility]     Pre-Op H&P: Scheduled with PCP - Flower Taiban     Post-Op Appt Date w/ NP/PA:  Iris Graf, APRN, CNP 02/17 at 10:15AM      Bowel Prep:  Yes  2 Fleets Sent via [employer] Message    Discussed with patient pre-op RN will call 2-3 days prior to surgery with arrival time and instructions:  Yes       Standard Surgery Packet Sent: Yes 01/16/25  via [employer] Message      Additional Comments: Patient is aware to have a  for surgery.     Patient has questions regarding how much time to take off work/expected recovery time - Routed to RN      All patients questions were answered and was instructed to review surgical packet and call back with any questions or concerns.       Raquel Napier on 1/16/2025 at 9:09 AM

## 2025-01-17 ENCOUNTER — ANESTHESIA EVENT (OUTPATIENT)
Dept: SURGERY | Facility: AMBULATORY SURGERY CENTER | Age: 40
End: 2025-01-17
Payer: COMMERCIAL

## 2025-01-20 ENCOUNTER — HOSPITAL ENCOUNTER (OUTPATIENT)
Facility: AMBULATORY SURGERY CENTER | Age: 40
Discharge: HOME | End: 2025-01-20
Attending: STUDENT IN AN ORGANIZED HEALTH CARE EDUCATION/TRAINING PROGRAM | Admitting: STUDENT IN AN ORGANIZED HEALTH CARE EDUCATION/TRAINING PROGRAM
Payer: COMMERCIAL

## 2025-01-20 ENCOUNTER — ANESTHESIA (OUTPATIENT)
Dept: SURGERY | Facility: AMBULATORY SURGERY CENTER | Age: 40
End: 2025-01-20
Payer: COMMERCIAL

## 2025-01-20 VITALS
RESPIRATION RATE: 16 BRPM | OXYGEN SATURATION: 100 % | DIASTOLIC BLOOD PRESSURE: 86 MMHG | BODY MASS INDEX: 25.73 KG/M2 | HEIGHT: 72 IN | TEMPERATURE: 97.8 F | HEART RATE: 53 BPM | WEIGHT: 190 LBS | SYSTOLIC BLOOD PRESSURE: 120 MMHG

## 2025-01-20 DIAGNOSIS — K64.9 HEMORRHOIDS, UNSPECIFIED HEMORRHOID TYPE: Primary | ICD-10-CM

## 2025-01-20 PROCEDURE — 88304 TISSUE EXAM BY PATHOLOGIST: CPT | Mod: 26 | Performed by: PATHOLOGY

## 2025-01-20 PROCEDURE — 88304 TISSUE EXAM BY PATHOLOGIST: CPT | Mod: TC | Performed by: STUDENT IN AN ORGANIZED HEALTH CARE EDUCATION/TRAINING PROGRAM

## 2025-01-20 RX ORDER — GLYCOPYRROLATE 0.2 MG/ML
INJECTION, SOLUTION INTRAMUSCULAR; INTRAVENOUS PRN
Status: DISCONTINUED | OUTPATIENT
Start: 2025-01-20 | End: 2025-01-20

## 2025-01-20 RX ORDER — ONDANSETRON 4 MG/1
4 TABLET, ORALLY DISINTEGRATING ORAL EVERY 30 MIN PRN
Status: DISCONTINUED | OUTPATIENT
Start: 2025-01-20 | End: 2025-01-21 | Stop reason: HOSPADM

## 2025-01-20 RX ORDER — OXYCODONE HYDROCHLORIDE 5 MG/1
5-10 TABLET ORAL EVERY 6 HOURS PRN
Qty: 30 TABLET | Refills: 0 | Status: SHIPPED | OUTPATIENT
Start: 2025-01-20

## 2025-01-20 RX ORDER — ONDANSETRON 2 MG/ML
4 INJECTION INTRAMUSCULAR; INTRAVENOUS EVERY 30 MIN PRN
Status: DISCONTINUED | OUTPATIENT
Start: 2025-01-20 | End: 2025-01-20 | Stop reason: HOSPADM

## 2025-01-20 RX ORDER — ONDANSETRON 2 MG/ML
INJECTION INTRAMUSCULAR; INTRAVENOUS PRN
Status: DISCONTINUED | OUTPATIENT
Start: 2025-01-20 | End: 2025-01-20

## 2025-01-20 RX ORDER — ACETAMINOPHEN 325 MG/1
650 TABLET ORAL
Status: DISCONTINUED | OUTPATIENT
Start: 2025-01-20 | End: 2025-01-21 | Stop reason: HOSPADM

## 2025-01-20 RX ORDER — ONDANSETRON 4 MG/1
4 TABLET, ORALLY DISINTEGRATING ORAL EVERY 30 MIN PRN
Status: DISCONTINUED | OUTPATIENT
Start: 2025-01-20 | End: 2025-01-20 | Stop reason: HOSPADM

## 2025-01-20 RX ORDER — SODIUM CHLORIDE, SODIUM LACTATE, POTASSIUM CHLORIDE, CALCIUM CHLORIDE 600; 310; 30; 20 MG/100ML; MG/100ML; MG/100ML; MG/100ML
INJECTION, SOLUTION INTRAVENOUS CONTINUOUS
Status: DISCONTINUED | OUTPATIENT
Start: 2025-01-20 | End: 2025-01-20 | Stop reason: HOSPADM

## 2025-01-20 RX ORDER — HYDROMORPHONE HYDROCHLORIDE 1 MG/ML
0.4 INJECTION, SOLUTION INTRAMUSCULAR; INTRAVENOUS; SUBCUTANEOUS EVERY 5 MIN PRN
Status: DISCONTINUED | OUTPATIENT
Start: 2025-01-20 | End: 2025-01-20 | Stop reason: HOSPADM

## 2025-01-20 RX ORDER — LIDOCAINE 40 MG/G
CREAM TOPICAL
Status: DISCONTINUED | OUTPATIENT
Start: 2025-01-20 | End: 2025-01-20 | Stop reason: HOSPADM

## 2025-01-20 RX ORDER — HYDROMORPHONE HYDROCHLORIDE 1 MG/ML
0.2 INJECTION, SOLUTION INTRAMUSCULAR; INTRAVENOUS; SUBCUTANEOUS EVERY 5 MIN PRN
Status: DISCONTINUED | OUTPATIENT
Start: 2025-01-20 | End: 2025-01-20 | Stop reason: HOSPADM

## 2025-01-20 RX ORDER — ACETAMINOPHEN 325 MG/1
650 TABLET ORAL 4 TIMES DAILY
Qty: 100 TABLET | Refills: 0 | Status: SHIPPED | OUTPATIENT
Start: 2025-01-20 | End: 2025-02-03

## 2025-01-20 RX ORDER — DEXAMETHASONE SODIUM PHOSPHATE 10 MG/ML
4 INJECTION, SOLUTION INTRAMUSCULAR; INTRAVENOUS
Status: DISCONTINUED | OUTPATIENT
Start: 2025-01-20 | End: 2025-01-21 | Stop reason: HOSPADM

## 2025-01-20 RX ORDER — DEXAMETHASONE SODIUM PHOSPHATE 4 MG/ML
INJECTION, SOLUTION INTRA-ARTICULAR; INTRALESIONAL; INTRAMUSCULAR; INTRAVENOUS; SOFT TISSUE PRN
Status: DISCONTINUED | OUTPATIENT
Start: 2025-01-20 | End: 2025-01-20

## 2025-01-20 RX ORDER — NALOXONE HYDROCHLORIDE 0.4 MG/ML
0.1 INJECTION, SOLUTION INTRAMUSCULAR; INTRAVENOUS; SUBCUTANEOUS
Status: DISCONTINUED | OUTPATIENT
Start: 2025-01-20 | End: 2025-01-21 | Stop reason: HOSPADM

## 2025-01-20 RX ORDER — ONDANSETRON 2 MG/ML
4 INJECTION INTRAMUSCULAR; INTRAVENOUS EVERY 30 MIN PRN
Status: DISCONTINUED | OUTPATIENT
Start: 2025-01-20 | End: 2025-01-21 | Stop reason: HOSPADM

## 2025-01-20 RX ORDER — FENTANYL CITRATE 50 UG/ML
INJECTION, SOLUTION INTRAMUSCULAR; INTRAVENOUS PRN
Status: DISCONTINUED | OUTPATIENT
Start: 2025-01-20 | End: 2025-01-20

## 2025-01-20 RX ORDER — OXYCODONE HYDROCHLORIDE 5 MG/1
5-10 TABLET ORAL EVERY 6 HOURS PRN
Qty: 20 TABLET | Refills: 0 | Status: SHIPPED | OUTPATIENT
Start: 2025-01-20 | End: 2025-01-20

## 2025-01-20 RX ORDER — FENTANYL CITRATE 50 UG/ML
25 INJECTION, SOLUTION INTRAMUSCULAR; INTRAVENOUS EVERY 5 MIN PRN
Status: DISCONTINUED | OUTPATIENT
Start: 2025-01-20 | End: 2025-01-20 | Stop reason: HOSPADM

## 2025-01-20 RX ORDER — ONDANSETRON 4 MG/1
4 TABLET, ORALLY DISINTEGRATING ORAL
Status: DISCONTINUED | OUTPATIENT
Start: 2025-01-20 | End: 2025-01-21 | Stop reason: HOSPADM

## 2025-01-20 RX ORDER — OXYCODONE HYDROCHLORIDE 5 MG/1
5 TABLET ORAL
Status: COMPLETED | OUTPATIENT
Start: 2025-01-20 | End: 2025-01-20

## 2025-01-20 RX ORDER — OXYCODONE HYDROCHLORIDE 5 MG/1
10 TABLET ORAL
Status: DISCONTINUED | OUTPATIENT
Start: 2025-01-20 | End: 2025-01-21 | Stop reason: HOSPADM

## 2025-01-20 RX ORDER — PROPOFOL 10 MG/ML
INJECTION, EMULSION INTRAVENOUS CONTINUOUS PRN
Status: DISCONTINUED | OUTPATIENT
Start: 2025-01-20 | End: 2025-01-20

## 2025-01-20 RX ORDER — ACETAMINOPHEN 325 MG/1
975 TABLET ORAL ONCE
Status: COMPLETED | OUTPATIENT
Start: 2025-01-20 | End: 2025-01-20

## 2025-01-20 RX ORDER — DEXAMETHASONE SODIUM PHOSPHATE 10 MG/ML
4 INJECTION, SOLUTION INTRAMUSCULAR; INTRAVENOUS
Status: DISCONTINUED | OUTPATIENT
Start: 2025-01-20 | End: 2025-01-20 | Stop reason: HOSPADM

## 2025-01-20 RX ORDER — BUPIVACAINE HYDROCHLORIDE 2.5 MG/ML
INJECTION, SOLUTION INFILTRATION; PERINEURAL PRN
Status: DISCONTINUED | OUTPATIENT
Start: 2025-01-20 | End: 2025-01-20 | Stop reason: HOSPADM

## 2025-01-20 RX ORDER — FENTANYL CITRATE 50 UG/ML
50 INJECTION, SOLUTION INTRAMUSCULAR; INTRAVENOUS EVERY 5 MIN PRN
Status: DISCONTINUED | OUTPATIENT
Start: 2025-01-20 | End: 2025-01-20 | Stop reason: HOSPADM

## 2025-01-20 RX ORDER — IBUPROFEN 800 MG/1
800 TABLET, FILM COATED ORAL 3 TIMES DAILY
Qty: 42 TABLET | Refills: 0 | Status: SHIPPED | OUTPATIENT
Start: 2025-01-20 | End: 2025-02-03

## 2025-01-20 RX ORDER — NALOXONE HYDROCHLORIDE 0.4 MG/ML
0.1 INJECTION, SOLUTION INTRAMUSCULAR; INTRAVENOUS; SUBCUTANEOUS
Status: DISCONTINUED | OUTPATIENT
Start: 2025-01-20 | End: 2025-01-20 | Stop reason: HOSPADM

## 2025-01-20 RX ORDER — PROPOFOL 10 MG/ML
INJECTION, EMULSION INTRAVENOUS PRN
Status: DISCONTINUED | OUTPATIENT
Start: 2025-01-20 | End: 2025-01-20

## 2025-01-20 RX ORDER — LIDOCAINE HYDROCHLORIDE 20 MG/ML
INJECTION, SOLUTION INFILTRATION; PERINEURAL PRN
Status: DISCONTINUED | OUTPATIENT
Start: 2025-01-20 | End: 2025-01-20

## 2025-01-20 RX ADMIN — FENTANYL CITRATE 50 MCG: 50 INJECTION, SOLUTION INTRAMUSCULAR; INTRAVENOUS at 13:18

## 2025-01-20 RX ADMIN — ACETAMINOPHEN 975 MG: 325 TABLET ORAL at 12:38

## 2025-01-20 RX ADMIN — PROPOFOL 125 MCG/KG/MIN: 10 INJECTION, EMULSION INTRAVENOUS at 13:34

## 2025-01-20 RX ADMIN — PROPOFOL 20 MG: 10 INJECTION, EMULSION INTRAVENOUS at 13:21

## 2025-01-20 RX ADMIN — FENTANYL CITRATE 50 MCG: 50 INJECTION, SOLUTION INTRAMUSCULAR; INTRAVENOUS at 13:08

## 2025-01-20 RX ADMIN — PROPOFOL 30 MG: 10 INJECTION, EMULSION INTRAVENOUS at 13:11

## 2025-01-20 RX ADMIN — OXYCODONE HYDROCHLORIDE 5 MG: 5 TABLET ORAL at 14:02

## 2025-01-20 RX ADMIN — GLYCOPYRROLATE 0.2 MG: 0.2 INJECTION, SOLUTION INTRAMUSCULAR; INTRAVENOUS at 13:08

## 2025-01-20 RX ADMIN — LIDOCAINE HYDROCHLORIDE 100 MG: 20 INJECTION, SOLUTION INFILTRATION; PERINEURAL at 13:07

## 2025-01-20 RX ADMIN — ONDANSETRON 4 MG: 2 INJECTION INTRAMUSCULAR; INTRAVENOUS at 13:22

## 2025-01-20 RX ADMIN — PROPOFOL 150 MCG/KG/MIN: 10 INJECTION, EMULSION INTRAVENOUS at 13:07

## 2025-01-20 RX ADMIN — SODIUM CHLORIDE, SODIUM LACTATE, POTASSIUM CHLORIDE, CALCIUM CHLORIDE: 600; 310; 30; 20 INJECTION, SOLUTION INTRAVENOUS at 13:01

## 2025-01-20 RX ADMIN — DEXAMETHASONE SODIUM PHOSPHATE 4 MG: 4 INJECTION, SOLUTION INTRA-ARTICULAR; INTRALESIONAL; INTRAMUSCULAR; INTRAVENOUS; SOFT TISSUE at 13:15

## 2025-01-20 RX ADMIN — FENTANYL CITRATE 25 MCG: 50 INJECTION, SOLUTION INTRAMUSCULAR; INTRAVENOUS at 14:02

## 2025-01-20 RX ADMIN — FENTANYL CITRATE 25 MCG: 50 INJECTION, SOLUTION INTRAMUSCULAR; INTRAVENOUS at 14:07

## 2025-01-20 NOTE — OR NURSING
Radha Subramanian MD was in the patient room discussing surgery and post-op with the patient. I confirmed that the patient needed to void prior to discharge and the doctor said she did not, so followed the verbal order. Pt discharged without urinary discomfort.

## 2025-01-20 NOTE — DISCHARGE INSTRUCTIONS
Anorectal Surgery Instructions    What can I expect after anorectal surgery?  Most anorectal procedures are done as outpatient surgery, and you go home the same day as the procedure. These recommendations will help you heal and feel more comfortable.     Medicines:  The anal area is very sensitive; you can expect to have some pain for up to 2-4 weeks after the procedure. Your doctor will give you a prescription for one or more pain medications.    Take acetaminophen (Tylenol) 650-1000 mg four times a day (unless your doctor told you otherwise).   Take this on a regular basis (not as needed) following surgery for pain control.   Take the lower dose if you are >65 years old or have liver disease. The maximum dose of acetaminophen is 4000 mg a day. You can stop the acetaminophen or reduce the dose when you are feeling better.  It is important to realize that many narcotic pain relievers (including vicodin, percocet, tylenol #3) also have acetaminophen, and excessive doses of acetaminophen can be dangerous, so do not take these in addition to acetominaphen. You may take narcotics that don't contain acetominaphen such as oxycodone.      Take ibuprofen 600 mg three times a day (unless you have IBD, severe kidney disease, or your doctor told you otherwise)   Take this on a regular basis (not as needed) following your surgery.   The drugs are best taken with food.  Do not take if it causes stomach upset or if you have a history of ulcers or gastritis. You can stop this or reduce the dose when you are feeling better.    Here is a sample schedule of how to take ibuprofen and tylenol on a scheduled basis:  8am: Tylenol 650-1000mg, ibuprofen 600mg   12pm: Tylenol 650mg-1000mg  2pm: Ibuprofen 600mg  4pm: Tylenol 650-1000mg   8pm Tylenol 650-1000mg, Ibuprofen 600mg    Take oxycodone AS NEEDED in addition to the acetaminophen and ibuprofen.    Because this is a narcotic it has side effects (including constipation), you should  reduce your use of this medication as tolerated as your pain improves.    *In general, the best strategy is to take (if you are able to tolerate it) the tylenol and ibuprofen on a regular basis until your pain has largely gone away. You can take the narcotic pain medicine as needed in addition to the tylenol and ibuprofen. As your pain begins to lessen, you should cut back on your narcotic use while continuing to take your regular tylenol and ibuprofen doses.    Refilling prescriptions. If you need additional pain medication, please call the triage nurse at [phone] during normal business hours (8 a.m. to 4 p.m., Monday though Friday) or have your pharmacy fax a refill request to [phone]. If you call after hours or on the weekends, the doctor on call may not know you personally and may not renew narcotic pain medication by phone. Call your primary care provider for all other medication refills.    Perineal/surgical site care:  Tub baths:  If possible, take a tub bath or shower immediately after each bowel movement. You can also use a squirt bottle when outside the home.   Baths should be take at least 3 times daily for the first week to 10 days following your procedure. You should soak in the tub for 10 to 15 minutes each time with warm water. You may also use a microphone shower head (with an extension) to shower your back-side instead of a bath.  Even after you go back to work, it is a good idea to sit in the tub in the morning, after returning from work, and again in the evening before bedtime.    Bleeding/Infection:  You can expect to have some bleeding after bowel movements or you may experience intermittent minor bleeding or discharge   Use a wet cloth or perianal pad to gently wipe the area after each bowel movement.  Do not rub the anal area or use a lot of pressure.  Using a spray bottle filled with warm water helps loosen any remaining stool. Blot gently with a soft dry cloth or tissue  paper.  Infection around the anal opening is not very common. The anal area has excellent blood supply, which helps the area to heal. Bloody discharge after bowel movements is normal and may last 2 to 4 weeks after your surgery. However, if you bleed between bowel movements and cannot get it to stop, call the triage nurse immediately [phone].    Bowel function:  Take a fiber supplement such as Metamucil or Citrucel POWDER, which is over the counter. You may start with 2 tablespoons daily and slowly increase up to three times a day, if needed, over the next 4-6 weeks. If you are taking the fiber supplement three times a day that means you are taking 2 tablespoons three times a day (total 6 tablespoons daily).  It is important to drink six to eight glasses of water or juice everyday when using fiber products.    * Constipation will cause you to strain when you have a bowel movement. The hard stool will be difficult to pass, will increase pain and bleeding, and will slow down healing. Try to avoid constipation and/or diarrhea as this can make the pain and bleeding worse.    * It is important to have regular bowel movements at least every other day and to keep your stool soft. A high fiber diet, including at least four servings of fruits or vegetables daily, will help to keep your bowel movements regular and soft.    If you do not have a bowel movement after 1-2 days:  Take Milk of Magnesia-2 tablespoons.     If there are no results, repeat this or add over the counter Miralax (1 capful daily).  If you still do not have results, contact the clinic.   Stop taking Milk of Magnesia or other laxatives if you begin to have diarrhea.    Activity:  After your procedure, there are no restrictions on your activity   If you are on narcotic pain medication you should not operate heavy machinery or drive.   You may walk, climb stairs, ride in a car, and sit as tolerated.   It is helpful to avoid sitting in one position for  long periods (2 or more hours).    When to call:  When do I need to call the doctor or triage nurse?  If you experience any of the problems listed here, call our triage nurse during business hours, 8am-5pm ([phone]).   The nurse will help you with your problem or have the doctor call you.   After hours and on weekends, please call the main hospital number ([phone]) and ask for the colon and rectal surgery resident on call.   Call for:   ? Fever greater than 101 degrees   ? Chills   ? Foul-smelling drainage   ? Nausea and vomiting   ? Diarrhea - greater than 4 water stools in 24 hours   ? Constipation - no bowel movement after 3 days   ? Severe bleeding that does not stop soon after a bowel movement or with applied pressure    ? Problems with the incision, including severe pain, swelling, or redness    [facility]  Home Care Following Anesthesia  For 24 hours after surgery:  Get plenty of rest.  A responsible adult must stay with you for at least 24 hours after you leave the surgery center.  Do not drive or use heavy equipment.  If you have weakness or tingling, don't drive or use heavy equipment until this feeling goes away.   Do not drink alcohol.   Avoid strenuous or risky activities.  Ask for help when climbing stairs.  You may feel lightheaded.  IF so, sit for a few minutes before standing.  Have someone help you get up.   If you have nausea (feel sick to your stomach): Drink only clear liquids such as apple juice, ginger ale, broth or 7-Up.  Rest may also help.  Be sure to drink enough fluids.  Move to a regular diet as you feel able.   You may have a slight fever.  Call the doctor if your fever is over 100 F (37.7 C) (taken under the tongue) or lasts longer than 24 hours.  You may have a dry mouth, a sore throat, muscle aches or trouble sleeping. These should go away after 24 hours.  Do not make important or legal decisions.   It is recommended to avoid smoking.                Tips for taking pain medications  To get the best pain relief possible, remember these points:  Take pain medications as directed, before pain becomes severe.  Pain medication can upset your stomach: taking it with food may help.  Constipation is a common side effect of pain medication. Drink plenty of  fluids.  Eat foods high in fiber. Take a stool softener if recommended by your doctor or pharmacist.  Do not drink alcohol, drive or operate machinery while taking pain medications.  Ask about other ways to control pain, such as with heat, ice or relaxation.    Tylenol/Acetaminophen Consumption    If you feel your pain relief is insufficient, you may take Tylenol/Acetaminophen in addition to your narcotic pain medication.   Be careful not to exceed 4,000 mg of Tylenol/Acetaminophen in a 24 hour period from all sources.  If you are taking extra strength Tylenol/acetaminophen (500 mg), the maximum dose is 8 tablets in 24 hours.  If you are taking regular strength acetaminophen (325 mg), the maximum dose is 12 tablets in 24 hours.    Call a doctor for any of the following:  Signs of infection (fever, growing tenderness at the surgery site, a large amount of drainage or bleeding, severe pain, foul-smelling drainage, redness, swelling).  It has been over 8 to 10 hours since surgery and you are still not able to urinate (pass water).  Headache for over 24 hours.  Numbness, tingling or weakness the day after surgery (if you had spinal anesthesia).  Signs of Covid-19 infection (temperature over 100 degrees, shortness of breath, cough, loss of taste/smell, generalized body aches, persistent headache, chills, sore throat, nausea/vomiting/diarrhea)    Your doctor is:       Dr. Fannie Subramanian, Colon Rectal: [phone]               After hours and weekends call the hospital @ [phone] and ask for the resident on call for:  Colon Rectal  For emergency care, call the:  [facility] Emergency Department:  [phone] (TTY for hearing impaired: [phone])

## 2025-01-20 NOTE — ANESTHESIA CARE TRANSFER NOTE
Patient: Nathaly Ramirez    Procedure: Procedure(s):  exam under anesthesia, hemorrhoidectomy, excision of hypertrophied anal papilla       Diagnosis: Partial rectal prolapse [K62.3]  Hypertrophied anal papilla [K62.89]  Hemorrhoids, unspecified hemorrhoid type [K64.9]  Diagnosis Additional Information: No value filed.    Anesthesia Type:   MAC     Note:    Oropharynx: oropharynx clear of all foreign objects and spontaneously breathing  Level of Consciousness: drowsy  Oxygen Supplementation: room air    Independent Airway: airway patency satisfactory and stable  Dentition: dentition unchanged  Vital Signs Stable: post-procedure vital signs reviewed and stable  Report to RN Given: handoff report given  Patient transferred to: Phase II    Handoff Report: Identifed the Patient, Identified the Reponsible Provider, Reviewed the pertinent medical history, Discussed the surgical course, Reviewed Intra-OP anesthesia mangement and issues during anesthesia, Set expectations for post-procedure period and Allowed opportunity for questions and acknowledgement of understanding      Vitals:  Vitals Value Taken Time   /77    Temp 97.7    Pulse 78    Resp 12    SpO2 97 % 01/20/25 1347   Vitals shown include unfiled device data.    Electronically Signed By: GONZALO Wolfe CRNA  January 20, 2025  1:49 PM

## 2025-01-20 NOTE — ANESTHESIA PREPROCEDURE EVALUATION
"Anesthesia Pre-Procedure Evaluation    Patient: Nathaly Ramirez   MRN: [id number] : 1985        Procedure : Procedure(s):  exam under anesthesia, hemorrhoidectomy, excision of hypertrophied anal papilla          No past medical history on file.   Past Surgical History:   Procedure Laterality Date    HYSTERECTOMY      OOPHORECTOMY        No Known Allergies   Social History     Tobacco Use    Smoking status: Every Day     Types: Cigarettes    Smokeless tobacco: Never   Substance Use Topics    Alcohol use: Not on file      Wt Readings from Last 1 Encounters:   01/15/25 86.2 kg (190 lb)        Anesthesia Evaluation   Pt has had prior anesthetic.     No history of anesthetic complications       ROS/MED HX  ENT/Pulmonary:       Neurologic:       Cardiovascular:       METS/Exercise Tolerance:     Hematologic:       Musculoskeletal:       GI/Hepatic:       Renal/Genitourinary:       Endo:       Psychiatric/Substance Use:     (+) psychiatric history anxiety       Infectious Disease:       Malignancy:       Other:               OUTSIDE LABS:  CBC: No results found for: \"WBC\", \"HGB\", \"HCT\", \"PLT\"  BMP: No results found for: \"NA\", \"POTASSIUM\", \"CHLORIDE\", \"CO2\", \"BUN\", \"CR\", \"GLC\"  COAGS: No results found for: \"PTT\", \"INR\", \"FIBR\"  POC: No results found for: \"BGM\", \"HCG\", \"HCGS\"  HEPATIC: No results found for: \"ALBUMIN\", \"PROTTOTAL\", \"ALT\", \"AST\", \"GGT\", \"ALKPHOS\", \"BILITOTAL\", \"BILIDIRECT\", \"CAMPOS\"  OTHER: No results found for: \"PH\", \"LACT\", \"A1C\", \"MONY\", \"PHOS\", \"MAG\", \"LIPASE\", \"AMYLASE\", \"TSH\", \"T4\", \"T3\", \"CRP\", \"SED\"    Anesthesia Plan    ASA Status:  1    NPO Status:  NPO Appropriate    Anesthesia Type: MAC.     - Reason for MAC: straight local not clinically adequate   Induction: Propofol.   Maintenance: TIVA.        Consents    Anesthesia Plan(s) and associated risks, benefits, and realistic alternatives discussed. Questions answered and patient/representative(s) expressed understanding.     - Discussed: Risks, " Benefits and Alternatives for BOTH SEDATION and the PROCEDURE were discussed     - Discussed with:  Patient            Postoperative Care    Pain management: IV analgesics, Oral pain medications.   PONV prophylaxis: Ondansetron (or other 5HT-3), Dexamethasone or Solumedrol     Comments:               Hernesto Alarcon MD    I have reviewed the pertinent notes and labs in the chart from the past 30 days and (re)examined the patient.  Any updates or changes from those notes are reflected in this note.                         # Overweight: Estimated body mass index is 25.77 kg/m  as calculated from the following:    Height as of 1/15/25: 1.829 m (6').    Weight as of 1/15/25: 86.2 kg (190 lb).

## 2025-01-20 NOTE — BRIEF OP NOTE
[facility]    Brief Operative Note    Pre-operative diagnosis: Partial rectal prolapse [K62.3]  Hypertrophied anal papilla [K62.89]  Hemorrhoids, unspecified hemorrhoid type [K64.9]  Post-operative diagnosis Same as pre-operative diagnosis    Procedure: exam under anesthesia, hemorrhoidectomy, excision of hypertrophied anal papilla, N/A - Anus    Surgeon: Surgeons and Role:     * Radha Subramanian MD - Primary  Anesthesia: MAC   Estimated Blood Loss: 20 ml    Drains: None  Specimens:   ID Type Source Tests Collected by Time Destination   1 : Left posterior hypertrophied anal papilla Tissue Anus SURGICAL PATHOLOGY EXAM Radha Subramanian MD 1/20/2025  1:21 PM    2 : Right posterior hemorrhoidectomy Tissue Anus SURGICAL PATHOLOGY EXAM Radha Subramanian MD 1/20/2025  1:30 PM      Findings:   Left posterior hypertrophied anal papilla. Right posterior external hemorrhoid and skin tag.   Complications: None.  Implants: * No implants in log *    Radha Subramanian MD  Colon & Rectal Surgery  [facility]

## 2025-01-22 LAB
PATH REPORT.COMMENTS IMP SPEC: NORMAL
PATH REPORT.COMMENTS IMP SPEC: NORMAL
PATH REPORT.FINAL DX SPEC: NORMAL
PATH REPORT.GROSS SPEC: NORMAL
PATH REPORT.MICROSCOPIC SPEC OTHER STN: NORMAL
PATH REPORT.RELEVANT HX SPEC: NORMAL
PHOTO IMAGE: NORMAL

## 2025-01-23 ENCOUNTER — NURSE TRIAGE (OUTPATIENT)
Dept: NURSING | Facility: CLINIC | Age: 40
End: 2025-01-23
Payer: COMMERCIAL

## 2025-01-23 NOTE — TELEPHONE ENCOUNTER
Nurse Triage SBAR       Is this a 2nd Level Triage?  YES, LICENSED PRACTITIONER REVIEW IS REQUIRED    CC:last bm 3 days ago    Situation:  Pt s/p hemorrhoidectomy, excision of anal papilla with Dr Subramanian on 1/20/25.  Pt reports that she hasn't had BM x3days.  Last BM was right before procedure.  Pt states she is passing a lot of gas, not having abdominal swelling or pain.  Pt reports eating soft, bland diet and drinking roughly 48 ozs of fluids/day.  Pt has been taking Oxycontin for pain and started taking Miralax 2 days ago.  Pt denies fever, bleeding or drainage from from rectum.  Pt instructed to take Milk of Magnesia to help stimulate bowels, increase fluids and fiber intake, move around as tolerated and to call back if isnt successful in having BM.  If pain under control, can try switching to Ibuprofen. Pt would like call back from clinic to further assess symptoms     Protocol Recommended Disposition:   Discuss With PCP And Call Back By Nurse Today        Recommendation: Please call pt to further assess symptoms. Encouraged patient to:  Callback if: symptoms worsen or if new symptoms arise or go to ER for evaluation. Pt verbalizes understanding and amenable to plan.     Routed to provider/care team.  Kaity JOSEPH RN  P Central Nursing/Red Flag Triage & Med Refill Team      Reason for Disposition   Patient wants to be seen    Additional Information   Negative: Abdomen bloating or swelling are main symptoms   Negative: Abdomen pain is main symptom and male   Negative: Abdomen pain is main symptom and female   Negative: Rectal bleeding or blood in stool is main symptom   Negative: Vomiting bile (green color)   Negative: Patient sounds very sick or weak to the triager   Negative: Constant abdominal pain lasting > 2 hours   Negative: Vomiting and abdomen looks much more swollen than usual   Negative: Rectal pain or fullness from fecal impaction (rectum full of stool) and NOT better after SITZ bath,  "suppository or enema   Negative: Abdomen is more swollen than usual   Negative: Last bowel movement (BM) > 4 days ago   Negative: Leaking stool   Negative: MILD abdominal pain (e.g., does not interfere with normal activities) that comes and goes (cramps) and fever   Negative: Unable to have a bowel movement (BM) without manually removing stool (using finger to pull out stool or perform disimpaction)   Negative: Unable to have a bowel movement (BM) without using a laxative, suppository, or enema   Negative: Significant weight loss (more than 10 pounds [4.5 kg]; 5% or more) and not dieting   Negative: Mild swelling of abdomen (e.g., looks distended or swollen) AND new-onset or getting worse   Negative: Pencil-like, narrow stools    Answer Assessment - Initial Assessment Questions  1. STOOL PATTERN OR FREQUENCY: \"How often do you have a bowel movement (BM)?\"  (Normal range: 3 times a day to every 3 days)  \"When was your last BM?\"        3 days ago  2. STRAINING: \"Do you have to strain to have a BM?\"       no  3. ONSET: \"When did the constipation begin?\"      3 days ago  4. RECTAL PAIN: \"Does your rectum hurt when the stool comes out?\" If Yes, ask: \"Do you have hemorrhoids? How bad is the pain?\"  (Scale 1-10; or mild, moderate, severe)      Post hemorrhoid surgery  5. BM COMPOSITION: \"Are the stools hard?\"       Havent had a stool  6. BLOOD ON STOOLS: \"Has there been any blood on the toilet tissue or on the surface of the BM?\" If Yes, ask: \"When was the last time?\"      Havent had a stool  7. CHRONIC CONSTIPATION: \"Is this a new problem for you?\"  If No, ask: \"How long have you had this problem?\" (days, weeks, months)       no  8. CHANGES IN DIET OR HYDRATION: \"Have there been any recent changes in your diet?\" \"How much fluids are you drinking on a daily basis?\"  \"How much have you had to drink today?\"      48 ozs/day  9. MEDICINES: \"Have you been taking any new medicines?\" \"Are you taking any narcotic pain medicines?\" " "(e.g., Dilaudid, morphine, Percocet, Vicodin)      oxycontin  10. LAXATIVES: \"Have you been using any stool softeners, laxatives, or enemas?\"  If Yes, ask \"What, how often, and when was the last time?\"        Just started miralax  11. ACTIVITY:  \"How much walking do you do every day?\"  \"Has your activity level decreased in the past week?\"         yes  12. CAUSE: \"What do you think is causing the constipation?\"         Oxy, recal surgery  13. MEDICAL HISTORY: \"Do you have a history of hemorrhoids, rectal fissures, rectal surgery, or rectal abscess?\"          Hx of hemorroids  14. OTHER SYMPTOMS: \"Do you have any other symptoms?\" (e.g., abdomen pain, bloating, fever, vomiting)        Passing gas  15. PREGNANCY: \"Is there any chance you are pregnant?\" \"When was your last menstrual period?\"        no    Protocols used: Constipation-A-[place]    "

## 2025-01-23 NOTE — TELEPHONE ENCOUNTER
Pt 3 days s/p hemorrhoidectomy with no bowel movement. Passing lots of gas. No nausea/vomiting. Tolerating PO intake. No other symptoms. Has taken daily miralax x 2 day.    Recommended increasing miralax to twice daily and starting a fiber supplement (ex Citrucel powder) 1-2 times daily as needed. Instructed to call back if not passing gas or not tolerating oral intake/nausea/vomiting.    No other questions at this time. Post op visits as scheduled.   [facility]

## 2025-02-17 ENCOUNTER — OFFICE VISIT (OUTPATIENT)
Dept: SURGERY | Facility: CLINIC | Age: 40
End: 2025-02-17
Payer: COMMERCIAL

## 2025-02-17 VITALS
HEIGHT: 72 IN | SYSTOLIC BLOOD PRESSURE: 127 MMHG | WEIGHT: 182.6 LBS | DIASTOLIC BLOOD PRESSURE: 90 MMHG | BODY MASS INDEX: 24.73 KG/M2 | HEART RATE: 94 BPM | OXYGEN SATURATION: 100 %

## 2025-02-17 DIAGNOSIS — Z09 FOLLOW-UP EXAMINATION AFTER COLORECTAL SURGERY: Primary | ICD-10-CM

## 2025-02-17 RX ORDER — MONTELUKAST SODIUM 10 MG/1
10 TABLET ORAL
COMMUNITY
Start: 2022-01-10

## 2025-02-17 ASSESSMENT — PAIN SCALES - GENERAL: PAINLEVEL_OUTOF10: MILD PAIN (2)

## 2025-02-17 NOTE — NURSING NOTE
Chief Complaint   Patient presents with    Post-op Visit       Vitals:    02/17/25 0950   BP: 127/90   BP Location: Left arm   Patient Position: Sitting   Cuff Size: Adult Regular   Pulse: 94   SpO2: 100%   Weight: 182 lb 9.6 oz   Height: 6'       Body mass index is 24.77 kg/m .    Pinky Eller EMT

## 2025-02-17 NOTE — LETTER
2025       RE: Nathaly Ramirez  [address]  [address]  [facility] [address]     Dear Colleague,    Thank you for referring your patient, Nathaly Ramirez, to the [facility] [place] at [facility]. Please see a copy of my visit note below.    Colon and Rectal Surgery Postoperative Clinic Note    RE: Nathaly Ramirez  : 1985  MARIANGEL: 2025    Nathaly Ramirez is a very pleasant 39 year old female status post EUA with excision of hypertrophic anal papilla and external hemorrhoid/tag on 25 by Dr. Subramanian.    Final Diagnosis   A. LEFT POSTERIOR HYPERTROPHIED ANAL PAPILLA:  - Squamous mucosa with submucosal ectatic vessels, consistent with hemorrhoid   B. RIGHT POSTERIOR HEMORRHOIDECTOMY:  - Squamous mucosa with submucosal ectatic vessels, consistent with hemorrhoid   - Hypertrophic anal papilla     Interval history: Nathaly has been doing well. No significant pain and pain is a lot better than before surgery. Some drainage and minor bleeding after bowel movements. Some constipation.    Physical Examination: Exam was chaperoned by EBONY Shannon   /90 (BP Location: Left arm, Patient Position: Sitting, Cuff Size: Adult Regular)   Pulse 94   Ht 6'   Wt 182 lb 9.6 oz   SpO2 100%   BMI 24.77 kg/m    General: alert, oriented, in no acute distress, sitting comfortably  HEENT: mucous membranes moist    Perianal external examination:  External surgical sites well approximated without erythema or drainage. Dissolving sutures in place.    Digital rectal examination: Was deferred.    Anoscopy: Was deferred.    Assessment/Plan:  39 year old female status post EUA with excision of hypertrophic anal papilla and external hemorrhoid/tag on 25 by Dr. Subramanian. She is recovering well and pain is greatly improved. Having some constipation and advised daily fiber supplement and titrate Miralax in addition to get stools  soft. Can follow up as needed. Patient's questions were answered to her stated satisfaction and she is in agreement with this plan.     Medical history:  No past medical history on file.    Surgical history:  Past Surgical History:   Procedure Laterality Date     HEMORRHOIDECTOMY EXTERNAL N/A 1/20/2025    Procedure: exam under anesthesia, hemorrhoidectomy, excision of hypertrophied anal papilla;  Surgeon: Radha Subramanian MD;  Location: [facility] OR     HYSTERECTOMY       OOPHORECTOMY         Problem list:    Patient Active Problem List    Diagnosis Date Noted     Partial rectal prolapse 01/15/2025     Priority: Medium     Hypertrophied anal papilla 01/15/2025     Priority: Medium     Hemorrhoids, unspecified hemorrhoid type 01/15/2025     Priority: Medium     Anxiety 06/07/2021     Priority: Medium       Medications:  Current Outpatient Medications   Medication Sig Dispense Refill     acetaminophen (TYLENOL) 325 MG tablet        busPIRone (BUSPAR) 5 MG tablet Take 5 mg by mouth       fluticasone (FLONASE) 50 MCG/ACT nasal spray Spray 1-2 sprays into both nostrils daily       montelukast (SINGULAIR) 10 MG tablet Take 10 mg by mouth.       polyethylene glycol (MIRALAX) 17 g packet        sertraline (ZOLOFT) 100 MG tablet Take 150 mg by mouth       ibuprofen (ADVIL/MOTRIN) 100 MG tablet every 4 hours as needed.       oxyCODONE (ROXICODONE) 5 MG tablet Take 1-2 tablets (5-10 mg) by mouth every 6 hours as needed for severe pain. 30 tablet 0       Allergies:  No Known Allergies    Family history:  No family history on file.    Social history:  Social History     Tobacco Use     Smoking status: Every Day     Types: Cigarettes     Smokeless tobacco: Never   Substance Use Topics     Alcohol use: Not on file     Marital status: .    Nursing Notes:   Pinky Eller  2/17/2025  9:53 AM  Signed  Chief Complaint   Patient presents with     Post-op Visit       Vitals:    02/17/25 0950   BP: 127/90   BP Location: Left arm    Patient Position: Sitting   Cuff Size: Adult Regular   Pulse: 94   SpO2: 100%   Weight: 182 lb 9.6 oz   Height: 6'       Body mass index is 24.77 kg/m .    EBONY Shannon     15 minutes spent on the date of the encounter doing chart review, history and exam, documentation and further activities as noted above.   This is a postop visit.    GONZALO Sheth, NP-C  Colon and Rectal Surgery  [facility]    This note was created using speech recognition software and may contain unintended word substitutions.      Again, thank you for allowing me to participate in the care of your patient.      Sincerely,    GONZALO Sheth CNP

## 2025-02-17 NOTE — PROGRESS NOTES
Colon and Rectal Surgery Postoperative Clinic Note    RE: Nathaly Ramirez  : 1985  MARIANGEL: 2025    Nathaly Ramirez is a very pleasant 39 year old female status post EUA with excision of hypertrophic anal papilla and external hemorrhoid/tag on 25 by Dr. Subramanian.    Final Diagnosis   A. LEFT POSTERIOR HYPERTROPHIED ANAL PAPILLA:  - Squamous mucosa with submucosal ectatic vessels, consistent with hemorrhoid   B. RIGHT POSTERIOR HEMORRHOIDECTOMY:  - Squamous mucosa with submucosal ectatic vessels, consistent with hemorrhoid   - Hypertrophic anal papilla     Interval history: Nathaly has been doing well. No significant pain and pain is a lot better than before surgery. Some drainage and minor bleeding after bowel movements. Some constipation.    Physical Examination: Exam was chaperoned by EBONY Shannon   /90 (BP Location: Left arm, Patient Position: Sitting, Cuff Size: Adult Regular)   Pulse 94   Ht 6'   Wt 182 lb 9.6 oz   SpO2 100%   BMI 24.77 kg/m    General: alert, oriented, in no acute distress, sitting comfortably  HEENT: mucous membranes moist    Perianal external examination:  External surgical sites well approximated without erythema or drainage. Dissolving sutures in place.    Digital rectal examination: Was deferred.    Anoscopy: Was deferred.    Assessment/Plan:  39 year old female status post EUA with excision of hypertrophic anal papilla and external hemorrhoid/tag on 25 by Dr. Subramanian. She is recovering well and pain is greatly improved. Having some constipation and advised daily fiber supplement and titrate Miralax in addition to get stools soft. Can follow up as needed. Patient's questions were answered to her stated satisfaction and she is in agreement with this plan.     Medical history:  No past medical history on file.    Surgical history:  Past Surgical History:   Procedure Laterality Date    HEMORRHOIDECTOMY EXTERNAL N/A 2025    Procedure: exam under  anesthesia, hemorrhoidectomy, excision of hypertrophied anal papilla;  Surgeon: Radha Subramanian MD;  Location: [facility] OR    HYSTERECTOMY      OOPHORECTOMY         Problem list:    Patient Active Problem List    Diagnosis Date Noted    Partial rectal prolapse 01/15/2025     Priority: Medium    Hypertrophied anal papilla 01/15/2025     Priority: Medium    Hemorrhoids, unspecified hemorrhoid type 01/15/2025     Priority: Medium    Anxiety 06/07/2021     Priority: Medium       Medications:  Current Outpatient Medications   Medication Sig Dispense Refill    acetaminophen (TYLENOL) 325 MG tablet       busPIRone (BUSPAR) 5 MG tablet Take 5 mg by mouth      fluticasone (FLONASE) 50 MCG/ACT nasal spray Spray 1-2 sprays into both nostrils daily      montelukast (SINGULAIR) 10 MG tablet Take 10 mg by mouth.      polyethylene glycol (MIRALAX) 17 g packet       sertraline (ZOLOFT) 100 MG tablet Take 150 mg by mouth      ibuprofen (ADVIL/MOTRIN) 100 MG tablet every 4 hours as needed.      oxyCODONE (ROXICODONE) 5 MG tablet Take 1-2 tablets (5-10 mg) by mouth every 6 hours as needed for severe pain. 30 tablet 0       Allergies:  No Known Allergies    Family history:  No family history on file.    Social history:  Social History     Tobacco Use    Smoking status: Every Day     Types: Cigarettes    Smokeless tobacco: Never   Substance Use Topics    Alcohol use: Not on file     Marital status: .    Nursing Notes:   Pinky Eller  2/17/2025  9:53 AM  Signed  Chief Complaint   Patient presents with    Post-op Visit       Vitals:    02/17/25 0950   BP: 127/90   BP Location: Left arm   Patient Position: Sitting   Cuff Size: Adult Regular   Pulse: 94   SpO2: 100%   Weight: 182 lb 9.6 oz   Height: 6'       Body mass index is 24.77 kg/m .    EBONY Shannon     15 minutes spent on the date of the encounter doing chart review, history and exam, documentation and further activities as noted above.   This is a postop  visit.    GONZALO Sheth, NP-C  Colon and Rectal Surgery  [facility]    This note was created using speech recognition software and may contain unintended word substitutions.

## 2025-03-16 ENCOUNTER — HEALTH MAINTENANCE LETTER (OUTPATIENT)
Age: 40
End: 2025-03-16

## 2025-03-20 ENCOUNTER — PRE VISIT (OUTPATIENT)
Dept: SURGERY | Facility: CLINIC | Age: 40
End: 2025-03-20

## (undated) DEVICE — SYR 10ML FINGER CONTROL W/O NDL 309695

## (undated) DEVICE — SURGICEL HEMOSTAT 4X8" 1952

## (undated) DEVICE — ESU GROUND PAD ADULT W/CORD E7507

## (undated) DEVICE — SU VICRYL+ 3-0 27IN SH UND VCP416H

## (undated) DEVICE — PAD CHUX UNDERPAD 30X36" P3036C

## (undated) DEVICE — RX BACITRACIN OINTMENT 14G 0.5OZ 45802-060-01

## (undated) DEVICE — SUCTION MANIFOLD NEPTUNE 2 SYS 1 PORT 702-025-000

## (undated) DEVICE — LINEN TOWEL PACK X5 5464

## (undated) DEVICE — PREP POVIDONE IODINE SOLUTION 10% 4OZ BOTTLE 29906-004

## (undated) DEVICE — PANTIES MESH LG/XLG 2PK 706M2

## (undated) DEVICE — STRAP KNEE/BODY 31143004

## (undated) DEVICE — PACK RECTAL KIT CUSTOM ASC

## (undated) DEVICE — SOL WATER IRRIG 500ML BOTTLE 2F7113

## (undated) DEVICE — SU MONOCRYL 3-0 SH 27" UND Y416H

## (undated) DEVICE — GLOVE BIOGEL PI SZ 6.5 40865

## (undated) DEVICE — GLOVE BIOGEL PI MICRO SZ 6.0 48560

## (undated) DEVICE — OINTMENT ANTIBIOTIC BACITRACIN ZINC .9 G 1171

## (undated) DEVICE — DRSG GAUZE 4X4" TRAY 6939

## (undated) DEVICE — TAPE DURAPORE 3" SILK 1538-3

## (undated) RX ORDER — FENTANYL CITRATE 50 UG/ML
INJECTION, SOLUTION INTRAMUSCULAR; INTRAVENOUS
Status: DISPENSED
Start: 2025-01-20

## (undated) RX ORDER — OXYCODONE HYDROCHLORIDE 5 MG/1
TABLET ORAL
Status: DISPENSED
Start: 2025-01-20

## (undated) RX ORDER — DEXMEDETOMIDINE HYDROCHLORIDE 4 UG/ML
INJECTION, SOLUTION INTRAVENOUS
Status: DISPENSED
Start: 2025-01-20

## (undated) RX ORDER — PROPOFOL 10 MG/ML
INJECTION, EMULSION INTRAVENOUS
Status: DISPENSED
Start: 2025-01-20

## (undated) RX ORDER — ACETAMINOPHEN 325 MG/1
TABLET ORAL
Status: DISPENSED
Start: 2025-01-20

## (undated) RX ORDER — DEXAMETHASONE SODIUM PHOSPHATE 4 MG/ML
INJECTION, SOLUTION INTRA-ARTICULAR; INTRALESIONAL; INTRAMUSCULAR; INTRAVENOUS; SOFT TISSUE
Status: DISPENSED
Start: 2025-01-20

## (undated) RX ORDER — GLYCOPYRROLATE 0.2 MG/ML
INJECTION, SOLUTION INTRAMUSCULAR; INTRAVENOUS
Status: DISPENSED
Start: 2025-01-20

## (undated) RX ORDER — ONDANSETRON 2 MG/ML
INJECTION INTRAMUSCULAR; INTRAVENOUS
Status: DISPENSED
Start: 2025-01-20